# Patient Record
Sex: MALE | Race: WHITE | NOT HISPANIC OR LATINO | Employment: FULL TIME | URBAN - METROPOLITAN AREA
[De-identification: names, ages, dates, MRNs, and addresses within clinical notes are randomized per-mention and may not be internally consistent; named-entity substitution may affect disease eponyms.]

---

## 2017-04-03 ENCOUNTER — ALLSCRIPTS OFFICE VISIT (OUTPATIENT)
Dept: OTHER | Facility: OTHER | Age: 72
End: 2017-04-03

## 2017-04-10 DIAGNOSIS — Z85.820 PERSONAL HISTORY OF MALIGNANT MELANOMA OF SKIN: ICD-10-CM

## 2017-04-10 DIAGNOSIS — Z00.00 ENCOUNTER FOR GENERAL ADULT MEDICAL EXAMINATION WITHOUT ABNORMAL FINDINGS: ICD-10-CM

## 2017-11-08 ENCOUNTER — ALLSCRIPTS OFFICE VISIT (OUTPATIENT)
Dept: OTHER | Facility: OTHER | Age: 72
End: 2017-11-08

## 2017-11-09 NOTE — PROGRESS NOTES
Assessment    1  History of malignant melanoma of skin (V10 82) (Z85 820)    Plan  PMH: History of malignant melanoma of skin    · 1 - Lesley Bales MD, Dwayne Mckay  ([facility]) Co-Management  * Status: Active  Requested for: [id number]   Ordered;PMH: History of malignant melanoma of skin; Ordered By: Martha Spain Performed:  Due: 39THA0916  Care Summary provided  : Yes    Discussion/Summary  Discussion Summary:   Patient is now 5 years out from his T1a melanoma  We'll see him back on an as-needed basis  I provided them instructions regarding signs of local regional distant recurrence  I've asked him to contact us should he ever appreciate any unexplained changes in his health  We had a discussion regarding his nasal surgery  Erin suggested he seek a second opinion if he has any questions or concerns-which he does  All questions were answered the patient's satisfaction  Chief Complaint  Chief Complaint Free Text Note Form: One year melanoma follow up with Radha Anders for dermatology  Patient has questions regarding a basal cell on his left side of his nose  History of Present Illness  Diagnosis and Staging: January 2012 left lower back malignant melanoma, stage I (T1b, N0, M0)rate: 8 mm Â², pros 0 72 mm, anatomic level for ulceration absent amount positive at deep biopsy margin on original biopsy pathology    Treatment History: January 2012: Wide excision and no residual invasive melanoma however there is a foci of melanoma in situ, margins free  Current Therapy: Observation    Interval History: Patient has no complaints referable to his melanoma  He continues to see his dermatologist       Review of Systems  Complete Male ROS Surg Onc:  Constitutional: The patient denies new or recent history of general fatigue, no recent weight loss, no change in appetite  Eyes: No complaints of visual problems, no scleral icterus    ENT: no complaints of ear pain, no hoarseness, no difficulty swallowing,-- no tinnitus-- and-- no new masses in head, oral cavity, or neck  Cardiovascular: No complaints of chest pain, no palpitations, no ankle edema  Respiratory: No complaints of shortness of breath, no cough  Gastrointestinal: No complaints of jaundice, no bloody stools, no pale stools  Genitourinary: No complaints of dysuria, no hematuria, no nocturia, no frequent urination, no urethral discharge  Musculoskeletal: No complaints of weakness, paralysis, joint stiffness or arthralgias,  Integumentary: No complaints of rash, no new lesions  Neurological: No complaints of convulsions, no seizures, no dizziness  Hematologic/Lymphatic: No complaints of easy bruising  ROS Reviewed:   ROS reviewed  Active Problems  1  History of malignant melanoma of skin (V10 82) (Z85 820)   2  Hypercholesterolemia (272 0) (E78 00)   3  Hypertension (401 9) (I10)    Past Medical History  1  History of diverticulitis of colon (V12 79) (Z87 19)   2  History of malignant melanoma of skin (V10 82) (Z85 820)   3  History of sunburn (V13 3) (Z87 2)   4  History of Prostate Cancer (V10 46)    Surgical History    1  History of Cholecystectomy Laparoscopic   2  History of Coronary Artery Four Or More Arterial Bypass Grafts   3  History of Excision Of Lesion Trunk   4  History of Excision Of Lesion Trunk Malignant   5  History of Prostatectomy Radical   6  History of [place] Lymph Node Biopsy  Surgical History Reviewed: The surgical history was reviewed and updated today  Family History  Mother    1  No pertinent family history  Father    2  No pertinent family history  Sister    3  Family history of Breast Cancer (V16 3)  Family History Reviewed: The family history was reviewed and updated today  Social History     · Alcohol Use (History)   · Former smoker (P55 03) (F48 005)  Social History Reviewed: The social history was reviewed and is unchanged  Current Meds   1   Accupril 20 MG Oral Tablet; daily; Therapy: [id number] to (Last Rx:11Jan2012)  Requested for: 22KEJ7282 Ordered   2  Crestor 10 MG Oral Tablet; Therapy: 10VVN8593 to (Last Eveleen Shallow)  Requested for: 25Jan2012 Ordered   3  MetFORMIN HCl  MG Oral Tablet Extended Release 24 Hour; Therapy: 15HOJ2992 to (Evaluate:11Jun2014) Recorded   4  Metoprolol Succinate ER 50 MG Oral Tablet Extended Release 24 Hour; daily; Therapy: 57MBQ5161 to (Last Rx:11Jan2012)  Requested for: 90HCE0546 Ordered   5  Quinapril HCl - 20 MG Oral Tablet; Therapy: 98JQA0575 to (Last Rx:16Nov2011)  Requested for: 27WQS6171 Ordered   6  Xarelto TABS Recorded  Medication List Reviewed: The medication list was reviewed and updated today  Allergies  1  No Known Drug Allergies    Vitals  Vital Signs    Recorded: 87SFF1019 10:16AM   Temperature 98 3 F   Heart Rate 88   Respiration 18   Systolic 786   Diastolic 70   Height 5 ft 11 in   Weight 260 lb    BMI Calculated 36 26   BSA Calculated 2 35       Physical Exam   Constitutional: General appearance: The Patient is well-developed, well-nourished male who appears his stated age in no acute distress  He is pleasant and talkative  HEENT: Sclerae are anicteric  -- Mucous membranes are moist  -- Neck is supple without adenopathy  No JVD  Chest: The lungs are clear to auscultation  Cardiac: Heart is regular rate  Abdomen: Abdomen is soft, nontender without masses  Extremities: There is no clubbing or cyanosis  -- There is no edema  Neuro: Grossly nonfocal  -- Gait is normal    Lymphatic: no evidence of cervical adenopathy bilaterally  -- no evidence of axillary adenopathy bilaterally  -- no evidence of inguinal adenopathy bilaterally  Skin: Warm, anicteric  Additional Exam:  Examination of his left back melanoma site shows no evidence of local or regional recurrence  The remainder exam shows no evidence of distant disease  The 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   No evidence of infection  End of Encounter Meds    1  Accupril 20 MG Oral Tablet (Quinapril HCl); daily; Therapy: 06TUS0692 to (Last Rx:11Jan2012)  Requested for: 08GCS7057 Ordered   2  Crestor 10 MG Oral Tablet (Rosuvastatin Calcium); Therapy: 90ZTZ4903 to (Last Keily Gondola)  Requested for: 25Jan2012 Ordered   3  MetFORMIN HCl  MG Oral Tablet Extended Release 24 Hour; Therapy: 17UMQ0344 to (Evaluate:11Jun2014) Recorded   4  Metoprolol Succinate ER 50 MG Oral Tablet Extended Release 24 Hour; daily; Therapy: 79TFV2334 to (Last Rx:11Jan2012)  Requested for: 64SQQ7740 Ordered   5  Quinapril HCl - 20 MG Oral Tablet; Therapy: 59NRR1857 to (Last Rx:16Nov2011)  Requested for: 44BGE6617 Ordered   6   Xarelto TABS Recorded    Signatures   Electronically signed by : Jasmin Padron MD; Nov 8 2017 10:41AM EST                       (Author)

## 2018-01-13 VITALS
TEMPERATURE: 97.9 F | HEIGHT: 71 IN | DIASTOLIC BLOOD PRESSURE: 64 MMHG | OXYGEN SATURATION: 98 % | RESPIRATION RATE: 16 BRPM | WEIGHT: 247 LBS | HEART RATE: 83 BPM | SYSTOLIC BLOOD PRESSURE: 130 MMHG | BODY MASS INDEX: 34.58 KG/M2

## 2018-01-13 VITALS
RESPIRATION RATE: 18 BRPM | WEIGHT: 260 LBS | DIASTOLIC BLOOD PRESSURE: 70 MMHG | BODY MASS INDEX: 36.4 KG/M2 | SYSTOLIC BLOOD PRESSURE: 122 MMHG | HEIGHT: 71 IN | TEMPERATURE: 98.3 F | HEART RATE: 88 BPM